# Patient Record
Sex: MALE | Race: WHITE | URBAN - METROPOLITAN AREA
[De-identification: names, ages, dates, MRNs, and addresses within clinical notes are randomized per-mention and may not be internally consistent; named-entity substitution may affect disease eponyms.]

---

## 2023-01-24 ENCOUNTER — OFFICE VISIT (OUTPATIENT)
Dept: URBAN - METROPOLITAN AREA CLINIC 56 | Facility: LOCATION | Age: 63
End: 2023-01-24
Payer: COMMERCIAL

## 2023-01-24 DIAGNOSIS — H25.13 AGE-RELATED NUCLEAR CATARACT, BILATERAL: ICD-10-CM

## 2023-01-24 DIAGNOSIS — R51.9 HEADACHE: Primary | ICD-10-CM

## 2023-01-24 DIAGNOSIS — Z98.890 OTHER SPECIFIED POSTPROCEDURAL STATES: ICD-10-CM

## 2023-01-24 PROCEDURE — 99204 OFFICE O/P NEW MOD 45 MIN: CPT

## 2023-01-24 ASSESSMENT — INTRAOCULAR PRESSURE
OD: 14
OS: 14

## 2023-01-24 NOTE — IMPRESSION/PLAN
Impression: Other specified postprocedural states: Z98.890.  Plan: LASIK OU, flaps centered and clear

## 2023-01-24 NOTE — IMPRESSION/PLAN
Impression: Headache: R51.9. Plan: Ed pt on clinical findings. DWP that no ocular findings were noted that would explain the HAs. Recommend patient follow up with PCP for further evaluation. Acute onset of headache, denies diplopia, vision changes. Advised pt to seek care immediately if vision changes, double vision occurs. RTC 2 months with Dr. José Fields for CE.